# Patient Record
Sex: MALE | Race: WHITE | NOT HISPANIC OR LATINO
[De-identification: names, ages, dates, MRNs, and addresses within clinical notes are randomized per-mention and may not be internally consistent; named-entity substitution may affect disease eponyms.]

---

## 2017-05-25 ENCOUNTER — RECORDS - HEALTHEAST (OUTPATIENT)
Dept: ADMINISTRATIVE | Facility: OTHER | Age: 62
End: 2017-05-25

## 2017-10-16 ENCOUNTER — RECORDS - HEALTHEAST (OUTPATIENT)
Dept: ADMINISTRATIVE | Facility: OTHER | Age: 62
End: 2017-10-16

## 2017-10-20 ENCOUNTER — RECORDS - HEALTHEAST (OUTPATIENT)
Dept: ADMINISTRATIVE | Facility: OTHER | Age: 62
End: 2017-10-20

## 2017-11-27 ENCOUNTER — RECORDS - HEALTHEAST (OUTPATIENT)
Dept: GENERAL RADIOLOGY | Facility: CLINIC | Age: 62
End: 2017-11-27

## 2017-11-27 ENCOUNTER — OFFICE VISIT - HEALTHEAST (OUTPATIENT)
Dept: RHEUMATOLOGY | Facility: CLINIC | Age: 62
End: 2017-11-27

## 2017-11-27 DIAGNOSIS — M25.512 CHRONIC LEFT SHOULDER PAIN: ICD-10-CM

## 2017-11-27 DIAGNOSIS — M54.50 CHRONIC BILATERAL LOW BACK PAIN WITHOUT SCIATICA: ICD-10-CM

## 2017-11-27 DIAGNOSIS — M54.2 CHRONIC NECK PAIN: ICD-10-CM

## 2017-11-27 DIAGNOSIS — M79.18 MYOFASCIAL PAIN: ICD-10-CM

## 2017-11-27 DIAGNOSIS — G89.29 CHRONIC NECK PAIN: ICD-10-CM

## 2017-11-27 DIAGNOSIS — Z96.641 HISTORY OF RIGHT HIP REPLACEMENT: ICD-10-CM

## 2017-11-27 DIAGNOSIS — M54.50 LOW BACK PAIN: ICD-10-CM

## 2017-11-27 DIAGNOSIS — Z86.39 HISTORY OF DIABETES MELLITUS, TYPE II: ICD-10-CM

## 2017-11-27 DIAGNOSIS — G89.29 CHRONIC LEFT SHOULDER PAIN: ICD-10-CM

## 2017-11-27 DIAGNOSIS — G89.29 CHRONIC BILATERAL LOW BACK PAIN WITHOUT SCIATICA: ICD-10-CM

## 2017-11-27 DIAGNOSIS — G89.29 OTHER CHRONIC PAIN: ICD-10-CM

## 2017-11-27 DIAGNOSIS — G47.33 OSA ON CPAP: ICD-10-CM

## 2017-11-27 DIAGNOSIS — R91.1 PULMONARY NODULE: ICD-10-CM

## 2017-11-27 DIAGNOSIS — R68.2 DRY MOUTH: ICD-10-CM

## 2017-11-27 LAB
ALT SERPL W P-5'-P-CCNC: 51 U/L (ref 0–45)
AST SERPL W P-5'-P-CCNC: 36 U/L (ref 0–40)
CREAT SERPL-MCNC: 1.08 MG/DL (ref 0.7–1.3)
GFR SERPL CREATININE-BSD FRML MDRD: >60 ML/MIN/1.73M2

## 2017-11-27 RX ORDER — OXYCODONE AND ACETAMINOPHEN 5; 325 MG/1; MG/1
1 TABLET ORAL EVERY 4 HOURS PRN
Status: SHIPPED | COMMUNITY
Start: 2017-11-27

## 2017-11-27 RX ORDER — NAPROXEN 500 MG/1
500 TABLET ORAL 2 TIMES DAILY WITH MEALS
Status: SHIPPED | COMMUNITY
Start: 2017-11-27

## 2017-11-27 ASSESSMENT — MIFFLIN-ST. JEOR: SCORE: 2056.32

## 2017-11-28 ENCOUNTER — COMMUNICATION - HEALTHEAST (OUTPATIENT)
Dept: RHEUMATOLOGY | Facility: CLINIC | Age: 62
End: 2017-11-28

## 2017-11-28 LAB
ANA SER QL: 0.6 U
SS-A/RO AUTOANTIBODIES - HISTORICAL: 4 EU
SS-B/LA AUTOANTIBODIES - HISTORICAL: 0 EU

## 2017-11-29 ENCOUNTER — COMMUNICATION - HEALTHEAST (OUTPATIENT)
Dept: SLEEP MEDICINE | Facility: CLINIC | Age: 62
End: 2017-11-29

## 2017-11-29 LAB
HLA-B27 RESULT - HISTORICAL: NEGATIVE
INTERPRETATION: NORMAL

## 2017-11-30 ENCOUNTER — COMMUNICATION - HEALTHEAST (OUTPATIENT)
Dept: RHEUMATOLOGY | Facility: CLINIC | Age: 62
End: 2017-11-30

## 2017-12-05 ENCOUNTER — OFFICE VISIT - HEALTHEAST (OUTPATIENT)
Dept: SLEEP MEDICINE | Facility: CLINIC | Age: 62
End: 2017-12-05

## 2017-12-05 ENCOUNTER — AMBULATORY - HEALTHEAST (OUTPATIENT)
Dept: SLEEP MEDICINE | Facility: CLINIC | Age: 62
End: 2017-12-05

## 2017-12-05 DIAGNOSIS — G47.10 HYPERSOMNIA: ICD-10-CM

## 2017-12-05 DIAGNOSIS — G47.33 OSA ON CPAP: ICD-10-CM

## 2017-12-05 DIAGNOSIS — G47.8 SLEEP DYSFUNCTION WITH SLEEP STAGE DISTURBANCE: ICD-10-CM

## 2017-12-05 DIAGNOSIS — E66.9 OBESITY: ICD-10-CM

## 2017-12-05 ASSESSMENT — MIFFLIN-ST. JEOR: SCORE: 2051.79

## 2017-12-06 ENCOUNTER — AMBULATORY - HEALTHEAST (OUTPATIENT)
Dept: SLEEP MEDICINE | Facility: CLINIC | Age: 62
End: 2017-12-06

## 2018-03-22 ENCOUNTER — COMMUNICATION - HEALTHEAST (OUTPATIENT)
Dept: RHEUMATOLOGY | Facility: CLINIC | Age: 63
End: 2018-03-22

## 2018-03-22 DIAGNOSIS — M54.2 CHRONIC NECK PAIN: ICD-10-CM

## 2018-03-22 DIAGNOSIS — G89.29 CHRONIC LEFT SHOULDER PAIN: ICD-10-CM

## 2018-03-22 DIAGNOSIS — G89.29 CHRONIC BILATERAL LOW BACK PAIN WITHOUT SCIATICA: ICD-10-CM

## 2018-03-22 DIAGNOSIS — M25.512 CHRONIC LEFT SHOULDER PAIN: ICD-10-CM

## 2018-03-22 DIAGNOSIS — G89.29 CHRONIC NECK PAIN: ICD-10-CM

## 2018-03-22 DIAGNOSIS — M79.18 MYOFASCIAL PAIN: ICD-10-CM

## 2018-03-22 DIAGNOSIS — M54.50 CHRONIC BILATERAL LOW BACK PAIN WITHOUT SCIATICA: ICD-10-CM

## 2018-03-22 RX ORDER — CYCLOBENZAPRINE HCL 10 MG
TABLET ORAL
Qty: 90 TABLET | Refills: 0 | Status: SHIPPED | OUTPATIENT
Start: 2018-03-22

## 2018-05-11 ENCOUNTER — COMMUNICATION - HEALTHEAST (OUTPATIENT)
Dept: ADMINISTRATIVE | Facility: CLINIC | Age: 63
End: 2018-05-11

## 2018-07-10 ENCOUNTER — COMMUNICATION - HEALTHEAST (OUTPATIENT)
Dept: RHEUMATOLOGY | Facility: CLINIC | Age: 63
End: 2018-07-10

## 2020-09-25 ENCOUNTER — VIRTUAL VISIT (OUTPATIENT)
Dept: FAMILY MEDICINE | Facility: OTHER | Age: 65
End: 2020-09-25

## 2020-09-26 NOTE — PROGRESS NOTES
"Date: 2020 10:33:45  Clinician: Clemencia Rangel  Clinician NPI: [id number]  Patient: Gutierrez Parson  Patient : 1955  Patient Address: [address], [place] [address]  Patient Phone: [phone]  Visit Protocol: URI  Patient Summary:  Gutierrez is a 65 year old ( : 1955 ) male who initiated a [facility] Visit for COVID-19 (Coronavirus) evaluation and screening. When asked the question \"Please sign me up to receive news, health information and promotions. \", Gutierrez responded \"No\".    Gutierrez states his symptoms started gradually 10-13 days ago. After his symptoms started, they improved and then got worse again.   His symptoms consist of a cough, nasal congestion, a headache, rhinitis, nausea, facial pain or pressure, myalgia, malaise, and tooth pain.   Symptom details     Nasal secretions: The color of his mucus is yellow.    Cough: Gutierrez coughs a few times an hour and his cough is not more bothersome at night. Phlegm comes into his throat when he coughs. He believes his cough is caused by post-nasal drip. The color of the phlegm is yellow.     Facial pain or pressure: The facial pain or pressure does not feel worse when bending or leaning forward.     Headache: He states the headache is mild (1-3 on a 10 point pain scale).     Tooth pain: The tooth pain is not caused by a cavity, recent dental work, or other mouth problems.      Gutierrez denies having ear pain, anosmia, vomiting, wheezing, enlarged lymph nodes, fever, chills, sore throat, ageusia, and diarrhea. He also denies having a sinus infection within the past year, taking antibiotic medication in the past month, and having recent facial or sinus surgery in the past 60 days. He is not experiencing dyspnea.   Precipitating events  He has not recently been exposed to someone with influenza. Gutierrez has been in close contact with the following high risk individuals: people with asthma, heart disease or diabetes and children under the age of 5.   " Pertinent COVID-19 (Coronavirus) information  In the past 14 days, Gutierrez has not worked in a congregate living setting.   He does not work or volunteer as healthcare worker or a  and does not work or volunteer in a healthcare facility.   Gutierrez also has not lived in a congregate living setting in the past 14 days. He does not live with a healthcare worker.   Gutierrez has had a close contact with a laboratory-confirmed COVID-19 patient within 14 days of symptom onset. Additional information about contact with COVID-19 (Coronavirus) patient as reported by the patient (free text): Son in law who lives here and granddaughter who only visits ever other weekend. Son in law has covid pneumonia.   Since December 2019, Gutierrez and has not had upper respiratory infection or influenza-like illness. Has not been diagnosed with lab-confirmed COVID-19 test   Pertinent medical history  Gutierrez does not need a return to work/school note.   Weight: 295 lbs   Gutierrez does not smoke or use smokeless tobacco.   Weight: 295 lbs    MEDICATIONS: No current medications, ALLERGIES: NKDA  Clinician Response:  Dear Gutierrez,  Based on the information provided, you have acute bacterial sinusitis, also known as a sinus infection. Sinus infections are caused by bacteria or a virus and symptoms are almost always identical. The difference between the 2 types of infections is timing.  Sinus infections start as viral infections and symptoms improve on their own in about 7 days. If symptoms have not improved after 7 days or have even worsened, a bacterial infection may have developed.  Medication information  I am prescribing:       Fluticasone 50 mcg/actuation nasal spray. Inhale 2 sprays in each nostril 1 time per day; after 1 week, may adjust to 1 - 2 sprays in each nostril 1 time per day. This medication takes several days to start working, so keep taking it even if it doesn't help right away. There are no refills with this prescription.       Amoxicillin-pot clavulanate 875-125 mg oral tablet. Take 1 tablet by mouth every 12 hours for 10 days. There are no refills with this prescription.     Self care  Steps you can take to be as comfortable as possible:     Rest.    Drink plenty of fluids.    Take a warm shower to loosen congestion    Use a cool-mist humidifier.    Take a spoonful of honey to reduce your cough.     When to seek care  Please be seen in a clinic or urgent care if any of the following occur:     New symptoms develop, or symptoms become worse    Symptoms do not start to improve after 3 days of treatment     Call ahead before going to the clinic or urgent care.  It is possible to have an allergic reaction to an antibiotic even if you have not had one in the past. If you notice a new rash, significant swelling, or difficulty breathing, stop taking this medication immediately and go to a clinic or urgent care.  Additional treatment plan   Your symptoms show that you may have coronavirus (COVID-19). This illness can cause fever, cough and trouble breathing. Many people get a mild case and get better on their own. Some people can get very sick.  What should I do?  We would like to test you for this virus.   1. Please call [phone] to schedule your visit. Explain that you were referred by [facility] to have a COVID-19 test. Be ready to share your [facility] ID [facility].  The following will serve as your written order for this COVID Test, ordered by me, for the indication of suspected COVID [Z20.828]: The test will be ordered in [facility], our electronic health record, after you are scheduled. It will show as ordered and authorized by Jordan Nugent MD.  Order: COVID-19 (Coronavirus) PCR for SYMPTOMATIC testing from [facility].      2. When it's time for your COVID test:  Stay at least 6 feet away from others. (If someone will drive you to your test, stay in the backseat, as far away from the  as you can.)   Cover your mouth and nose with a mask,  "tissue or washcloth.  Go straight to the testing site. Don't make any stops on the way there or back.      3.Starting now: Stay home and away from others (self-isolate) until:   You've had no fever---and no medicine that reduces fever---for one full day (24 hours). And...   Your other symptoms have gotten better. For example, your cough or breathing has improved. And...   At least 10 days have passed since your symptoms started.       During this time, don't leave the house except for testing or medical care.   Stay in your own room, even for meals. Use your own bathroom if you can.   Stay away from others in your home. No hugging, kissing or shaking hands. No visitors.  Don't go to work, school or anywhere else.    Clean \"high touch\" surfaces often (doorknobs, counters, handles, etc.). Use a household cleaning spray or wipes. You'll find a full list of  on the EPA website: www.epa.gov/pesticide-registration/list-n-disinfectants-use-against-sars-cov-2.   Cover your mouth and nose with a mask, tissue or washcloth to avoid spreading germs.  Wash your hands and face often. Use soap and water.  Caregivers in these groups are at risk for severe illness due to COVID-19:  o People 65 years and older  o People who live in a nursing home or long-term care facility  o People with chronic disease (lung, heart, cancer, diabetes, kidney, liver, immunologic)  o People who have a weakened immune system, including those who:   Are in cancer treatment  Take medicine that weakens the immune system, such as corticosteroids  Had a bone marrow or organ transplant  Have an immune deficiency  Have poorly controlled HIV or AIDS  Are obese (body mass index of 40 or higher)  Smoke regularly   o Caregivers should wear gloves while washing dishes, handling laundry and cleaning bedrooms and bathrooms.  o Use caution when washing and drying laundry: Don't shake dirty laundry, and use the warmest water setting that you can.  o For more " tips, go to www.cdc.gov/coronavirus/2019-ncov/downloads/10Things.pdf.    4.Sign up for [employer]. We know it's scary to hear that you might have COVID-19. We want to track your symptoms to make sure you're okay over the next 2 weeks. Please look for an email from [employer]---this is a free, online program that we'll use to keep in touch. To sign up, follow the link in the email. Learn more at [email or web address]  How can I take care of myself?   Get lots of rest. Drink extra fluids (unless a doctor has told you not to).   Take Tylenol (acetaminophen) for fever or pain. If you have liver or kidney problems, ask your family doctor if it's okay to take Tylenol.   Adults can take either:    650 mg (two 325 mg pills) every 4 to 6 hours, or...   1,000 mg (two 500 mg pills) every 8 hours as needed.    Note: Don't take more than 3,000 mg in one day. Acetaminophen is found in many medicines (both prescribed and over-the-counter medicines). Read all labels to be sure you don't take too much.   For children, check the Tylenol bottle for the right dose. The dose is based on the child's age or weight.    If you have other health problems (like cancer, heart failure, an organ transplant or severe kidney disease): Call your specialty clinic if you don't feel better in the next 2 days.       Know when to call 911. Emergency warning signs include:    Trouble breathing or shortness of breath Pain or pressure in the chest that doesn't go away Feeling confused like you haven't felt before, or not being able to wake up Bluish-colored lips or face.  Where can I get more information?    [facility] -- About COVID-19: [email or web address]   CDC -- What to Do If You're Sick: www.cdc.gov/coronavirus/2019-ncov/about/steps-when-sick.html   CDC -- Ending Home Isolation: www.cdc.gov/coronavirus/2019-ncov/hcp/disposition-in-home-patients.html   CDC -- Caring for Someone:  www.cdc.gov/coronavirus/2019-ncov/if-you-are-sick/care-for-someone.html   [facility] -- Interim Guidance for Hospital Discharge to Home: www.health.[facility].[place].us/diseases/coronavirus/hcp/hospdischarge.pdf   [facility] clinical trials (COVID-19 research studies): clinicalaffairs.[facility]/umn-clinical-trials    Below are the COVID-19 hotlines at the [place] Department of Health ([facility]). Interpreters are available.    For health questions: Call 002-060-0268 or 1-646.870.2173 (7 a.m. to 7 p.m.) For questions about schools and childcare: Call 922-173-6957 or 1-172.138.4877 (7 a.m. to 7 p.m.)    Diagnosis: Cough  Diagnosis ICD: R05  Prescription: amoxicillin-pot clavulanate 875-125 mg oral tablet 20 tablet, 10 days supply. Take 1 tablet by mouth every 12 hours for 10 days. Refills: 0, Refill as needed: no, Allow substitutions: yes  Prescription: fluticasone 50 mcg/actuation nasal spray,suspension 1 120 spray aerosol with adapter (grams), 30 days supply. Inhale 2 sprays in each nostril 1 time per day; after 1 week, may adjust to 1 - 2 sprays in each nostril 1 time per day.. Refills: 0, Refill as needed: no, Allow substitutions: yes

## 2021-05-31 VITALS — HEIGHT: 68 IN | BODY MASS INDEX: 43.5 KG/M2 | WEIGHT: 287 LBS

## 2021-05-31 VITALS — HEIGHT: 68 IN | WEIGHT: 286 LBS | BODY MASS INDEX: 43.35 KG/M2

## 2021-06-03 ENCOUNTER — RECORDS - HEALTHEAST (OUTPATIENT)
Dept: ADMINISTRATIVE | Facility: CLINIC | Age: 66
End: 2021-06-03

## 2021-06-14 NOTE — PROGRESS NOTES
Dear Dr. Chary Simons Md  [address] [place] [address]    Thank you for the opportunity to participate in the care of Mr. Tj Alexandra.    He is a 62 y.o. male who comes to the clinic for the transfer care of his obstructive sleep apnea.  The patient was actually diagnosed with obstructive sleep apnea in  where his apnea hypopnea index was found to be 37.4 events per hour with the lowest O2 sat of 84%.  For further details concerning the patient's sleep study report please refer to the [facility].  The patient would like to establish care so that he can get a new CPAP machine and supplies for his equipment.  Without CPAP usage she would continue to have significant pauses in his breathing during sleep followed by loud snoring.  The patient's review of systems is positive for abdominal hernia.     Compliance Download data for 30 days:  Pressure settin cm water  Residual AHI: 1.4 events per hour  Leak: Minimal  Compliance: 17%  Mask Tolerance: Good  Skin irritation: None    Past Medical History  Past Medical History:   Diagnosis Date     Diabetes mellitus      Hypertension      Pulmonary nodule     4mm right sided     Sleep apnea         Past Surgical History  Past Surgical History:   Procedure Laterality Date     APPENDECTOMY       TOTAL HIP ARTHROPLASTY Right 2016    Procedure: #1  RIGHT HIP TOTAL ARTHROPLASTY;  Surgeon: Bernardo Christie MD;  Location: [facility];  Service:         Meds  Current Outpatient Prescriptions   Medication Sig Dispense Refill     aspirin 325 MG tablet Take 1 tablet (325 mg total) by mouth 2 (two) times a day with meals. Must take for 30 days for dvt prophylaxis. Take with meals  0     cyclobenzaprine (FLEXERIL) 10 MG tablet Take 1 tab p.o. qhs (if feeling groggy the following morning, can try taking half a tablet instead or can take earlier the night before). 90 tablet 0     exenatide microspheres 2 mg/0.65 mL PnIj Inject 2 mg under the skin once a  week. (on Monday mornings)       insulin glargine (LANTUS) 100 unit/mL injection Inject 100 Units under the skin daily. (at 3 pm)       lisinopril-hydrochlorothiazide (PRINZIDE,ZESTORETIC) 20-25 mg per tablet Take 1 tablet by mouth daily.       metFORMIN (GLUCOPHAGE-XR) 750 MG 24 hr tablet Take 3 tablets (2,250 mg total) by mouth daily. 1 tablet 0     metoprolol tartrate (LOPRESSOR) 50 MG tablet Take 75 mg by mouth 2 (two) times a day. (1.5 x 50 mg tabs = 75 mg dose)       mometasone (ELOCON) 0.1 % cream Apply 1 application topically 2 (two) times a day as needed.        naproxen (NAPROSYN) 500 MG tablet Take 500 mg by mouth 2 (two) times a day with meals.       omeprazole (PRILOSEC) 20 MG capsule Take 20 mg by mouth daily.       oxyCODONE-acetaminophen (PERCOCET) 5-325 mg per tablet Take 1 tablet by mouth every 4 (four) hours as needed for pain.       senna-docusate (PERICOLACE) 8.6-50 mg tablet Take 1 tablet by mouth 2 (two) times a day. Take daily for constipation or while on narcotics. Hold if developing loose stools  0     simvastatin (ZOCOR) 40 MG tablet Take 20 mg by mouth daily. (1/2 of 40 mg tab = 20 mg dose)       testosterone (ANDROGEL) 1.62 % (20.25 mg/1.25 gram) GlPk Place 3 pumps on the skin every morning.        No current facility-administered medications for this visit.         Allergies  Review of patient's allergies indicates no known allergies.     Social History  Social History     Social History     Marital status:      Spouse name: N/A     Number of children: N/A     Years of education: N/A     Occupational History     Not on file.     Social History Main Topics     Smoking status: Never Smoker     Smokeless tobacco: Not on file     Alcohol use No     Drug use: No     Sexual activity: Not on file     Other Topics Concern     Not on file     Social History Narrative        Family History  Family History   Problem Relation Age of Onset     Cancer Mother         Review of  Systems:  Constitutional: Negative except as noted in HPI.   Eyes: Negative except as noted in HPI.   ENT: Negative except as noted in HPI.   Cardiovascular: Negative except as noted in HPI.   Respiratory: Negative except as noted in HPI.   Gastrointestinal: Negative except as noted in HPI.   Genitourinary: Negative except as noted in HPI.   Musculoskeletal: Negative except as noted in HPI.   Integumentary: Negative except as noted in HPI.   Neurological: Negative except as noted in HPI.   Psychiatric: Negative except as noted in HPI.   Endocrine: Negative except as noted in HPI.   Hematologic/Lymphatic: Negative except as noted in HPI.      STOP BANG 12/5/2017   Do you snore loudly (louder than talking or loud enough to be heard through closed doors)? 1   Do you often feel tired, fatigued, or sleepy during daytime? 1   Has anyone observed you stop breathing in your sleep? 1   Do you have or are you being treated for high blood pressure? 1   BMI more than 35 kg/m2 1   Age over 50 years old? 1   Neck circumference greater than 16 inches? 1   Gender male? 1   Total Score 8   Epworths Sleepiness Scale 12/5/2017   Sitting and reading 3   Watching TV 3   Sitting, inactive in a public place (e.g. a theatre or a meeting) 0   As a passenger in a car for an hour without a break 0   Lying down to rest in the afternoon when circumstances permit 3   Sitting and talking to someone 0   Sitting quietly after a lunch without alcohol 0   In a car, while stopped for a few minutes in traffic 0   Total score 9   Rooming 12/5/2017   Usual bedtime Midnight   Sleep Latency less than 5 minutes   Awakenings 2 nocturnal awakening   Wake Up Time 3AM or 9AM   Weekends Same   Energy Drinks No   Coffee No   Cola No   Difficulty falling asleep No   Difficulty staying asleep Yes   Excessive daytime tiredness No   Excessive daytime sleepiness Yes   Dozing off while driving No   Shift Worker No   Sleep Walking? No   Sleep Talking? No   Kicking or  [Dear  ___] : Dear  [unfilled], "punching? No   Restless legs symptoms Yes       Physical Exam:  /80  Pulse (!) 102  Ht 5' 8\" (1.727 m)  Wt (!) 286 lb (129.7 kg)  SpO2 94%  BMI 43.49 kg/m2  BMI:Body mass index is 43.49 kg/(m^2).   GEN: NAD, morbidly obese  Head: Normocephalic.  EYES: PERRLA, EOMI  ENT: Oropharynx is clear, mallampatti class 4+ airway.   Nasal mucosa is moist without erythema  Neck : Thyroid is within normal limits. neck size: 18 inches  CV: Regular rate and rhythm, S1 & S2 positive.  LUNGS: Bilateral breathsounds heard.   ABDOMEN: Positive bowel sounds in all quadrants, soft, no rebound or guarding  MUSCULOSKELETAL: Bilateral 2+ leg swelling  SKIN: warm, dry, no rashes  Neurological: Alert, oriented to time, place, and person.  Psych: normal mood, normal affect     Labs/Studies:     Lab Results   Component Value Date    WBC 9.0 11/27/2017    HGB 16.4 11/27/2017    HCT 48.9 11/27/2017    MCV 90 11/27/2017     11/27/2017         Chemistry        Component Value Date/Time     07/03/2011 0527    K 4.3 08/19/2016 0619     (H) 07/03/2011 0527    CO2 24 07/03/2011 0527    BUN 30 (H) 07/03/2011 0527    CREATININE 1.08 11/27/2017 1136     (H) 08/18/2016 0643        Component Value Date/Time    CALCIUM 8.6 07/03/2011 0527    AST 36 11/27/2017 1136    ALT 51 (H) 11/27/2017 1136            No results found for: FERRITIN  Lab Results   Component Value Date    TSH 3.06 11/27/2017         Assessment and Plan:  In summary Tj Alexandra is a 62 y.o. year old male here for transfer of care.  1.  Obstructive sleep apnea on CPAP  I will write a prescription to allow the patient to receive a new CPAP machine as well as supplies for his equipment.  Since the patient does not have any preference with regards to durable medical equipment company, he is open to using [employer] as his durable medical equipment company.  2.  Hypersomnia  3.  Other sleep disturbance  4.  Obesity    Patient verbalized understanding of " [Courtesy Letter:] : I had the pleasure of seeing your patient, [unfilled], in my office today. [Please see my note below.] : Please see my note below. these issues, agrees with the plan and all questions were answered today. Patient was given an opportuntity to voice any other symptoms or concerns not listed above. Patient did not have any other symptoms or concerns.      Follow-up in 2 months and bring new CPAP machine.     Juan Carlos Hill DO  Board Certified in Internal Medicine and Sleep Medicine  [facility].    (Note created with Dragon voice recognition and unintended spelling errors and word substitutions may occur)      [Sincerely,] : Sincerely, [FreeTextEntry3] : Ed\par \par Trey Shetty MD\par [facility]\par  of Urology\par [employer] [facility]\par

## 2021-06-14 NOTE — PROGRESS NOTES
Order for Durable Medical Equipment was processed and equipment ordered.   DME provider: Beti ( changed his mind from [place])  Date Faxed: 12/06/2017  Ordering Provider: Dr. Hill  Equipment ordered: Cpap

## 2021-06-14 NOTE — PROGRESS NOTES
Order for Durable Medical Equipment was processed and equipment ordered.     DME provider: Katherine    Date Faxed: 12/5/17    Ordering Provider:     Equipment ordered: Resmed Cpap

## 2021-06-14 NOTE — PROGRESS NOTES
"Tj Alexandra who presents today with a chief complaint of  Consult, joint pains.      Joint Pains:  Yes  Location:  Neck, back  Onset: chronic (several yrs)  Intensity:  8-10/10  AM Stiffness: few minutes  Alleviating/Aggravating Factors: Prolonged sitting worsens pains.  Tolerating Meds: yes  Other: Naproxen prn provides mild benefit.      ROS:  Patient denies having any dry eyes, +dry mouth, no: oral ulcers, alopecia, + sporadic \"pimple like\" rashes, no: photosensitivity, history of psoriasis, chest pain, shortness of breath, cough, dysuria, history of kidney stones, abdominal pain, diarrhea, hematochezia, dysphagia, history of peptic ulcer disease, history of HIV, tuberculosis, hepatitis B or C, Lyme disease, seizure history, raynaud's, fevers, recent infections, involuntary weight loss, low back stiffness in AM, no: weakness, fatigue, depression, anxiety, loss of appetite, positive insomnia, + numbness and tingling in hands and feet dm related, no: temporal headaches, +GERD, double vision, loss of vision or painful vision.      Problem List:  Patient Active Problem List   Diagnosis     Hip arthritis     Type 2 diabetes mellitus without complication     Essential hypertension with goal blood pressure less than 130/80     PROSPER on CPAP     Dyslipidemia        PMH:   Past Medical History:   Diagnosis Date     Diabetes mellitus      Hypertension      Pulmonary nodule     4mm right sided     Sleep apnea        Surgical History:  Past Surgical History:   Procedure Laterality Date     APPENDECTOMY       TOTAL HIP ARTHROPLASTY Right 8/18/2016    Procedure: #1  RIGHT HIP TOTAL ARTHROPLASTY;  Surgeon: Bernardo Christie MD;  Location: [facility];  Service:        Family History:  No family history on file.    Social History:   reports that he has never smoked. He does not have any smokeless tobacco history on file. He reports that he does not drink alcohol or use illicit drugs.    Allergies:  No Known Allergies   " "  Current Medications:  Current Outpatient Prescriptions   Medication Sig Dispense Refill     aspirin 325 MG tablet Take 1 tablet (325 mg total) by mouth 2 (two) times a day with meals. Must take for 30 days for dvt prophylaxis. Take with meals  0     cyclobenzaprine (FLEXERIL) 10 MG tablet Take 10 mg by mouth 3 (three) times a day as needed for muscle spasms.       exenatide microspheres 2 mg/0.65 mL PnIj Inject 2 mg under the skin once a week. (on Monday mornings)       insulin glargine (LANTUS) 100 unit/mL injection Inject 100 Units under the skin daily. (at 3 pm)       lisinopril-hydrochlorothiazide (PRINZIDE,ZESTORETIC) 20-25 mg per tablet Take 1 tablet by mouth daily.       metFORMIN (GLUCOPHAGE-XR) 750 MG 24 hr tablet Take 3 tablets (2,250 mg total) by mouth daily. 1 tablet 0     metoprolol tartrate (LOPRESSOR) 50 MG tablet Take 75 mg by mouth 2 (two) times a day. (1.5 x 50 mg tabs = 75 mg dose)       mometasone (ELOCON) 0.1 % cream Apply 1 application topically 2 (two) times a day as needed.        omeprazole (PRILOSEC) 20 MG capsule Take 20 mg by mouth daily.       oxyCODONE-acetaminophen (PERCOCET) 5-325 mg per tablet Take 1 tablet by mouth every 4 (four) hours as needed for pain.       simvastatin (ZOCOR) 40 MG tablet Take 20 mg by mouth daily. (1/2 of 40 mg tab = 20 mg dose)       testosterone (ANDROGEL) 1.62 % (20.25 mg/1.25 gram) GlPk Place 3 pumps on the skin every morning.        naproxen (NAPROSYN) 500 MG tablet Take 500 mg by mouth 2 (two) times a day with meals.       senna-docusate (PERICOLACE) 8.6-50 mg tablet Take 1 tablet by mouth 2 (two) times a day. Take daily for constipation or while on narcotics. Hold if developing loose stools  0     No current facility-administered medications for this visit.            Physical Exam:  /80  Pulse (!) 110  Ht 5' 8\" (1.727 m)  Wt (!) 287 lb (130.2 kg)  BMI 43.64 kg/m2  General: A & O x 3 in NAD  HEENT: EOMI, Non injected/non icteric sclera, no " oral lesions noted  Neck: Supple, no cervical LAD or thyromegaly noted  Derm: No malar rash, psoriatic lesions or nail pitting appreciated  CV: s1s2 with RRR, no rubs appreciated   Lungs: CTA B/L, no wheezing , rales or rhonci appreciated  GI: Soft, NT/ND, no rebound, no guarding noted, no hepatomegally appreciated  MS: Positive left shoulder discomfort and passive/active range of motion testing.  Positive left empty can test.  Greater than 11/18 myofascial tender points.  Otherwise patient demonstrated good passive/active ROM over other joints with no warmth, erythema, tenderness or synovitis noted over these joints.  Back: Good C-spine active range of motion testing with no reproducible discomfort.  Negative straight leg raising, bilaterally. Palpating thoracic, lumbar vertebral/paravertebral and SI joint regions did not reproduce any pains.  Neuro: 5/5 strength in upper and lower extremities b/l, good sensation b/l,  2+ bicep and patella reflexes b/l      Summary/Assessment:    62-year-old male, presents with chronic neck and back pains.  At times has been experiencing left shoulder pain.  States has seen orthopedic spine, injections performed for back pains only providing transient relief.    Adds that he tries to avoid taking medications, at times will take naproxen when anticipating increased activity which provides some relief.    Denies any trauma to his left shoulder, neck and back.  Claims to have already performed PT.    Likely has some DJD contributing to his chronic neck and back pains.  Currently denies having any radiating symptoms.    May also have a component of myofascial pains given the tender points on exam along with history of insomnia and f PROSPER.    Regarding left shoulder pain may have component of rotator cuff tendinopathy playing a role given physical exam findings.    No clear signs of synovitis noted on exam today.    Given the above, patient's arthralgias/myalgias appears to be more  mechanical in nature at this time.    Pertinent rheumatology/past medical history (please refer to above for more detailed history):      Chronic low back pain    Chronic neck pain    History right hip replacement (8/2017)    Left shoulder pain    Dry mouth    Insomnia    History pulmonary nodule    History type 2 diabetes    Sleep apnea on CPAP    Left ear tumor    Overweight      Rheumatology medications provided/suggested:    Tylenol  Flexeril      Pertinent medication from other providers or from otc (please refer to above for more detailed med list):    Naprosyn  Insulin      Pertinent medications already tried:           Pertinent lab history:          Pertinent imaging/test history:    MRI's from outside facility:     MRI of the cervical spine from May 2017 showed signs of diffuse cervical spondylosis, 2-3 mm disc protrusion on the right involving C5-6 and C6-7 with some signs of ventral cord flattening noted.  1-2 mm disc protrusion was noted at C4-5 and C3-4 with mild dynamic ventral cord flattening on extension.  No cord abnormality, no neoplasm, fracture or infection.    MRI of lumbar spine from May 2017 showed: Moderate sized right lateralization L4-5 disc herniation with right L5 neural impingement.  Broad-based central bulge protrusion at L3-4 with mild central and subarticular recess narrowing. Broad-based central bulge/protrusion at L5-S1 contacts the left S1 root without compression.  No inflammatory facet arthropathy or osseous lesion.    Other:    Claims to have already tried PT.    Plan:      Suggested first trying Tylenol 500-1000 mg twice daily as needed for pain relief.    Continue Naprosyn sparingly for breakthrough pain (if insufficient relief with Tylenol).  Will avoid regular use given history of diabetes.    We will add Flexeril to act as muscle relaxer hopefully improve his sleep.    Regarding left shoulder made aware that if symptoms persist, can contact us for cortisone  injection.    Can try over-the-counter warm packs and/or icy hot patches for upper back/neck and low back pains.      Given MRI findings involving cervical spine and lumbar spine, offered patient referral to neurosurgery  to further evaluate and correlate clinically, patient deferred for now would first like to see how he responds to adding Flexeril and Tylenol.      Made aware that if desires repeat referral to physical therapy, can contact us.    We will check labs today correlate clinically.    We will x-ray SI joints.    Follow-up in 2 months.      Procedure note:              Spent 65 minutes with greater than half of this time spent with the patient going over differential diagnosis, prognosis, treatment plan, medication side effects and  answering questions.    Side effect profile of medications provided were discussed with the patient.    This note was transcribed using Dragon voice recognition software as a result unintentional grammatical errors or word substitutions may have occurred. Please contact our Rheumatology department if you need any clarification or if you have any related inquiries.    Thank you for referring this patient to our clinic.      Jerrod Martinez ....................  11/27/2017   10:15 AM